# Patient Record
Sex: MALE | Race: WHITE
[De-identification: names, ages, dates, MRNs, and addresses within clinical notes are randomized per-mention and may not be internally consistent; named-entity substitution may affect disease eponyms.]

---

## 2018-10-01 ENCOUNTER — HOSPITAL ENCOUNTER (EMERGENCY)
Dept: HOSPITAL 25 - ED | Age: 43
Discharge: HOME | End: 2018-10-01
Payer: COMMERCIAL

## 2018-10-01 DIAGNOSIS — R31.9: ICD-10-CM

## 2018-10-01 DIAGNOSIS — R10.32: Primary | ICD-10-CM

## 2018-10-01 DIAGNOSIS — Z88.7: ICD-10-CM

## 2018-10-01 LAB
BASOPHILS # BLD AUTO: 0 10^3/UL (ref 0–0.2)
EOSINOPHIL # BLD AUTO: 0.1 10^3/UL (ref 0–0.6)
HCT VFR BLD AUTO: 40 % (ref 42–52)
HGB BLD-MCNC: 13.5 G/DL (ref 14–18)
LYMPHOCYTES # BLD AUTO: 2.4 10^3/UL (ref 1–4.8)
MCH RBC QN AUTO: 30 PG (ref 27–31)
MCHC RBC AUTO-ENTMCNC: 34 G/DL (ref 31–36)
MCV RBC AUTO: 87 FL (ref 80–94)
MONOCYTES # BLD AUTO: 0.5 10^3/UL (ref 0–0.8)
NEUTROPHILS # BLD AUTO: 5 10^3/UL (ref 1.5–7.7)
NRBC # BLD AUTO: 0 10^3/UL
NRBC BLD QL AUTO: 0.1
PLATELET # BLD AUTO: 184 10^3/UL (ref 150–450)
RBC # BLD AUTO: 4.57 10^6/UL (ref 4–5.4)
RBC UR QL AUTO: (no result)
WBC # BLD AUTO: 8 10^3/UL (ref 3.5–10.8)

## 2018-10-01 PROCEDURE — 81015 MICROSCOPIC EXAM OF URINE: CPT

## 2018-10-01 PROCEDURE — 36415 COLL VENOUS BLD VENIPUNCTURE: CPT

## 2018-10-01 PROCEDURE — 99282 EMERGENCY DEPT VISIT SF MDM: CPT

## 2018-10-01 PROCEDURE — 80053 COMPREHEN METABOLIC PANEL: CPT

## 2018-10-01 PROCEDURE — 85025 COMPLETE CBC W/AUTO DIFF WBC: CPT

## 2018-10-01 PROCEDURE — 81003 URINALYSIS AUTO W/O SCOPE: CPT

## 2018-10-01 NOTE — ED
Back Pain





- HPI Summary


HPI Summary: 


Pt. is a 43 y.o male who presents to the ER for sudden onset of severe left 

flank pain that radiated into groin. Pt. states he took a dose of motrin and 

pain improved. Pt. also notes a difficult time urinating. Denies fever, chills, 

CP, SOB, N/V, testicle pain or swelling. No past medical hx. Symptoms are 

moderate in severity. No current modifying factors. Pt. denies recent injury/

falls, radicular pain in to legs, numbness, tingling or weakness. 








- History of Current Complaint


Chief Complaint: EDBackInjuryPain


Stated Complaint: LOWER BACK PAIN


Time Seen by Provider: 10/01/18 00:21


Hx Obtained From: Patient


Pain Intensity: 5





- Allergies/Home Medications


Allergies/Adverse Reactions: 


 Allergies











Allergy/AdvReac Type Severity Reaction Status Date / Time


 


tetanus toxoid, adsorbed Allergy  Fever Verified 10/01/18 00:15











Home Medications: 


 Home Medications





NK [No Home Medications Reported]  10/01/18 [History Confirmed 10/01/18]











PMH/Surg Hx/FS Hx/Imm Hx


Previously Healthy: Yes


Infectious Disease History: No


Infectious Disease History: 


   Denies: Traveled Outside the [place] in Last 30 Days





Review of Systems


Constitutional: Negative


Negative: Fever, Chills


Cardiovascular: Negative


Respiratory: Negative


Positive: Abdominal Pain.  Negative: Vomiting, Diarrhea, Nausea


Positive: flank pain, urgency


All Other Systems Reviewed And Are Negative: Yes





Physical Exam


Triage Information Reviewed: Yes


Vital Signs On Initial Exam: 


 Initial Vitals











Temp Pulse Resp BP Pulse Ox


 


 97.0 F   54   15   104/82   100 


 


 10/01/18 00:11  10/01/18 00:11  10/01/18 00:11  10/01/18 00:11  10/01/18 00:11











Vital Signs Reviewed: Yes


Appearance: Positive: Well-Appearing - Pt. sitting on bed in NAD.


Skin: Positive: Warm, Dry


Head/Face: Positive: Normal Head/Face Inspection


Eyes: Positive: Normal, EOMI


Neck: Positive: Supple


Abdomen Description: Positive: Other: - Abd. is soft. Mild tenderness to LLQ. 

Mild left flank tenderness.


Musculoskeletal: Positive: Normal, Strength/ROM Intact, Other - No midline back 

tenderness.


Neurological: Positive: Normal, CN Intact II-III


Psychiatric: Positive: Affect/Mood Appropriate





Diagnostics





- Vital Signs


 Vital Signs











  Temp Pulse Resp BP Pulse Ox


 


 10/01/18 00:11  97.0 F  54  15  104/82  100














- Laboratory


Result Diagrams: 


 10/01/18 00:39





 10/01/18 00:39


Lab Statement: Any lab studies that have been ordered have been reviewed, and 

results considered in the medical decision making process.





Back Pain Course/Dx





- Course


Course Of Treatment: Pt. presenting for an episode of left flank pain that 

radiated into left groin. His pain has mostly subsided and pain is currently 1/

10. Suspect urolithiasis. Pt. is afebrile. Will check basic labs and urine.  

CBC and CMP are unremarkable. U/A shows elevated RBCs, no signs of infection. 

Results discussed with pt. Advised pt. a CT scan would be able to evaluate size 

and location of stone. Pt. does not wish to have any imaging done tonight. He 

is aware that if stone is large there is a change it will not pass on its own 

and he will need to see urology. Advised pt. to increase fluids, strain urine, 

tylenol and motrin for pain as directed. Advised to return to ER for increased 

pain, vomiting, fever, or if concerned. To call PCP tomorrow for f.u. Pt. 

understands and agrees with plan.





- Diagnoses


Differential Diagnosis/HQI/PQRI: Positive: Herniated Disc, Renal Colic, Strain, 

Sprain


Provider Diagnoses: 


 Flank pain, Hematuria








Discharge





- Sign-Out/Discharge


Documenting (check all that apply): Patient Departure





- Discharge Plan


Condition: Good


Disposition: HOME


Patient Education Materials:  Kidney Stones (ED)


Referrals: 


Bharti Casper DO [Primary Care Provider] - 


Additional Instructions: 


Suspect you are passing a kidney stone based on your symptoms and microscopic 

blood in urine


A CT scan was offered to you tonight to evaluate for a stone


Schedule a follow up appointment with your PCP


Increase fluids


Can rotate between tylenol and motrin every 3 hours as directed for pain control


Strain urine


Return to ER for increased pain, vomiting, fever, or if concerned





- Billing Disposition and Condition


Condition: GOOD


Disposition: Home